# Patient Record
Sex: MALE | Race: WHITE | Employment: FULL TIME | URBAN - METROPOLITAN AREA
[De-identification: names, ages, dates, MRNs, and addresses within clinical notes are randomized per-mention and may not be internally consistent; named-entity substitution may affect disease eponyms.]

---

## 2017-01-26 PROBLEM — Z99.89 OSA ON CPAP: Status: ACTIVE | Noted: 2017-01-26

## 2017-01-26 PROBLEM — G47.33 OSA ON CPAP: Status: ACTIVE | Noted: 2017-01-26

## 2022-02-16 ENCOUNTER — HOSPITAL ENCOUNTER (OUTPATIENT)
Dept: LAB | Age: 57
Discharge: HOME | End: 2022-02-16

## 2022-02-16 PROCEDURE — 88305 TISSUE EXAM BY PATHOLOGIST: CPT

## 2022-03-20 PROBLEM — G47.33 OSA ON CPAP: Status: ACTIVE | Noted: 2017-01-26

## 2024-03-24 NOTE — PROGRESS NOTES
for: \"CHOL\"  No results found for: \"TRIG\"  No results found for: \"HDL\"  No results found for: \"LDLCHOLESTEROL\", \"LDLCALC\"  No results found for: \"VLDL\"  No results found for: \"CHOLHDLRATIO\"     No results found for: \"NA\", \"K\", \"CL\", \"CO2\", \"BUN\", \"CREATININE\", \"GLUCOSE\", \"CALCIUM\"      No results found for: \"ALT\", \"AST\"     Assessment/Plan:   1. Hypertension, unspecified type  - PCP note reviewed  - Well controlled  - Currently on losartan  - Obtain an echocardiogram     2. RBBB  - Chronic since at least 2018  - Obtain an echocardiogram     F/U: As needed    Per Gale MD

## 2024-03-26 ENCOUNTER — INITIAL CONSULT (OUTPATIENT)
Age: 59
End: 2024-03-26
Payer: COMMERCIAL

## 2024-03-26 VITALS
HEART RATE: 74 BPM | HEIGHT: 70 IN | WEIGHT: 177.2 LBS | DIASTOLIC BLOOD PRESSURE: 78 MMHG | SYSTOLIC BLOOD PRESSURE: 136 MMHG | BODY MASS INDEX: 25.37 KG/M2

## 2024-03-26 DIAGNOSIS — I45.10 RBBB: ICD-10-CM

## 2024-03-26 DIAGNOSIS — I10 HYPERTENSION, UNSPECIFIED TYPE: Primary | ICD-10-CM

## 2024-03-26 PROCEDURE — 3075F SYST BP GE 130 - 139MM HG: CPT | Performed by: INTERNAL MEDICINE

## 2024-03-26 PROCEDURE — 93000 ELECTROCARDIOGRAM COMPLETE: CPT | Performed by: INTERNAL MEDICINE

## 2024-03-26 PROCEDURE — 99203 OFFICE O/P NEW LOW 30 MIN: CPT | Performed by: INTERNAL MEDICINE

## 2024-03-26 PROCEDURE — 3078F DIAST BP <80 MM HG: CPT | Performed by: INTERNAL MEDICINE

## 2024-03-26 RX ORDER — VIT C/E/ZN/COPPR/LUTEIN/ZEAXAN 250MG-90MG
CAPSULE ORAL
COMMUNITY

## 2024-03-26 RX ORDER — LOSARTAN POTASSIUM 100 MG/1
100 TABLET ORAL DAILY
COMMUNITY

## 2024-04-30 ENCOUNTER — TELEPHONE (OUTPATIENT)
Age: 59
End: 2024-04-30

## 2024-04-30 NOTE — TELEPHONE ENCOUNTER
----- Message from Per Gale MD sent at 4/30/2024 12:23 PM EDT -----  Please let the patient know that the heart function is normal on ECHO.  The aortic root is normal size upon personal review of 3.5 cm.